# Patient Record
Sex: MALE | Race: WHITE | NOT HISPANIC OR LATINO | Employment: UNEMPLOYED | URBAN - METROPOLITAN AREA
[De-identification: names, ages, dates, MRNs, and addresses within clinical notes are randomized per-mention and may not be internally consistent; named-entity substitution may affect disease eponyms.]

---

## 2023-08-18 ENCOUNTER — OFFICE VISIT (OUTPATIENT)
Dept: PRIMARY CARE | Facility: CLINIC | Age: 53
End: 2023-08-18
Payer: COMMERCIAL

## 2023-08-18 VITALS
BODY MASS INDEX: 27.09 KG/M2 | OXYGEN SATURATION: 97 % | TEMPERATURE: 97.1 F | WEIGHT: 200 LBS | HEART RATE: 70 BPM | HEIGHT: 72 IN | SYSTOLIC BLOOD PRESSURE: 144 MMHG | DIASTOLIC BLOOD PRESSURE: 81 MMHG

## 2023-08-18 DIAGNOSIS — E78.2 MIXED HYPERLIPIDEMIA: ICD-10-CM

## 2023-08-18 DIAGNOSIS — R73.03 PREDIABETES: ICD-10-CM

## 2023-08-18 DIAGNOSIS — R29.818 SUSPECTED SLEEP APNEA: Primary | ICD-10-CM

## 2023-08-18 DIAGNOSIS — R06.83 LOUD SNORING: ICD-10-CM

## 2023-08-18 DIAGNOSIS — E66.3 OVERWEIGHT: ICD-10-CM

## 2023-08-18 DIAGNOSIS — I10 PRIMARY HYPERTENSION: ICD-10-CM

## 2023-08-18 DIAGNOSIS — R40.0 DAYTIME SOMNOLENCE: ICD-10-CM

## 2023-08-18 PROBLEM — E78.5 HYPERLIPIDEMIA: Status: ACTIVE | Noted: 2023-08-18

## 2023-08-18 PROCEDURE — 99214 OFFICE O/P EST MOD 30 MIN: CPT | Performed by: INTERNAL MEDICINE

## 2023-08-18 PROCEDURE — 3077F SYST BP >= 140 MM HG: CPT | Performed by: INTERNAL MEDICINE

## 2023-08-18 PROCEDURE — 3079F DIAST BP 80-89 MM HG: CPT | Performed by: INTERNAL MEDICINE

## 2023-08-18 PROCEDURE — 3008F BODY MASS INDEX DOCD: CPT | Performed by: INTERNAL MEDICINE

## 2023-08-18 RX ORDER — ROSUVASTATIN CALCIUM 10 MG/1
1 TABLET, COATED ORAL NIGHTLY
COMMUNITY
Start: 2021-06-23

## 2023-08-18 NOTE — PROGRESS NOTES
"Subjective   Lorna Gibbs is a 52 y.o. male who presents for Snoring.  HPI  Snoring: wife has been complaining; not new but recently addressed; worse on back  Tired: often feels tired during the day; wakes up tired  Observed: \"no rhythm\", no observed gasping, no mention of pauses  Pressure: grade 1 HTN, not treated  BMI: 27  Age: 52  Neck: 16 7/8\"/42cm  Gender: male    Even without scoring the observed, score is 6    Objective   /81   Pulse 70   Temp 36.2 °C (97.1 °F)   Ht 1.816 m (5' 11.5\")   Wt 90.7 kg (200 lb)   SpO2 97%   BMI 27.51 kg/m²    Physical Exam  NAD  Narrow posterior OP  RRR  Lungs CTAB  No edema  Assessment/Plan     High PTP of RADHA with symptoms of snoring, daytime somnolence and hypertension.    HLD: only taking Crestor intermittently (forgetting), has not rechecked    RTC after testing completed for AWV  Problem List Items Addressed This Visit    None           Davide Henderson MD   "

## 2023-10-15 PROBLEM — L30.9 DERMATITIS: Status: ACTIVE | Noted: 2023-10-15

## 2023-10-15 PROBLEM — H57.9 FUNDOSCOPY ABNORMAL: Status: ACTIVE | Noted: 2023-10-15

## 2023-10-15 PROBLEM — H52.00 HYPEROPIA: Status: ACTIVE | Noted: 2023-10-15

## 2023-10-15 PROBLEM — H52.03 HYPEROPIA OF BOTH EYES: Status: ACTIVE | Noted: 2023-10-15

## 2023-10-15 PROBLEM — H50.22 VERTICAL STRABISMUS OF LEFT EYE: Status: ACTIVE | Noted: 2023-10-15

## 2023-10-15 PROBLEM — H50.331 INTERMITTENT MONOCULAR EXOTROPIA OF RIGHT EYE: Status: ACTIVE | Noted: 2023-10-15

## 2023-10-15 PROBLEM — K42.9 UMBILICAL HERNIA WITHOUT OBSTRUCTION AND WITHOUT GANGRENE: Status: ACTIVE | Noted: 2023-10-15

## 2023-10-15 PROBLEM — H53.10: Status: ACTIVE | Noted: 2023-10-15

## 2023-10-15 PROBLEM — B35.3 TINEA PEDIS, RECURRENT: Status: ACTIVE | Noted: 2023-10-15

## 2023-10-15 PROBLEM — L29.0 PRURITUS ANI: Status: ACTIVE | Noted: 2023-10-15

## 2023-10-15 PROBLEM — G89.29 CHRONIC LEFT-SIDED LOW BACK PAIN WITHOUT SCIATICA: Status: ACTIVE | Noted: 2023-10-15

## 2023-10-15 PROBLEM — Z98.890 HISTORY OF STRABISMUS SURGERY: Status: ACTIVE | Noted: 2023-10-15

## 2023-10-15 PROBLEM — M54.50 CHRONIC LEFT-SIDED LOW BACK PAIN WITHOUT SCIATICA: Status: ACTIVE | Noted: 2023-10-15

## 2023-10-15 PROBLEM — B35.6 TINEA CRURIS: Status: ACTIVE | Noted: 2023-10-15

## 2023-10-17 ENCOUNTER — APPOINTMENT (OUTPATIENT)
Dept: SLEEP MEDICINE | Facility: CLINIC | Age: 53
End: 2023-10-17
Payer: COMMERCIAL

## 2023-11-20 ENCOUNTER — APPOINTMENT (OUTPATIENT)
Dept: PRIMARY CARE | Facility: CLINIC | Age: 53
End: 2023-11-20
Payer: COMMERCIAL

## 2024-07-11 ENCOUNTER — PROCEDURE VISIT (OUTPATIENT)
Dept: SLEEP MEDICINE | Facility: CLINIC | Age: 54
End: 2024-07-11
Payer: COMMERCIAL

## 2024-07-11 DIAGNOSIS — R29.818 SUSPECTED SLEEP APNEA: ICD-10-CM

## 2024-07-12 VITALS
WEIGHT: 204 LBS | BODY MASS INDEX: 28.56 KG/M2 | HEART RATE: 58 BPM | SYSTOLIC BLOOD PRESSURE: 152 MMHG | OXYGEN SATURATION: 95 % | DIASTOLIC BLOOD PRESSURE: 96 MMHG | RESPIRATION RATE: 16 BRPM | HEIGHT: 71 IN

## 2024-07-12 ASSESSMENT — SLEEP AND FATIGUE QUESTIONNAIRES
HOW LIKELY ARE YOU TO NOD OFF OR FALL ASLEEP IN A CAR, WHILE STOPPED FOR A FEW MINUTES IN TRAFFIC: SLIGHT CHANCE OF DOZING
ESS-CHAD TOTAL SCORE: 13
HOW LIKELY ARE YOU TO NOD OFF OR FALL ASLEEP WHEN YOU ARE A PASSENGER IN A CAR FOR AN HOUR WITHOUT A BREAK: MODERATE CHANCE OF DOZING
HOW LIKELY ARE YOU TO NOD OFF OR FALL ASLEEP WHILE SITTING AND TALKING TO SOMEONE: SLIGHT CHANCE OF DOZING
SITING INACTIVE IN A PUBLIC PLACE LIKE A CLASS ROOM OR A MOVIE THEATER: SLIGHT CHANCE OF DOZING
HOW LIKELY ARE YOU TO NOD OFF OR FALL ASLEEP WHILE SITTING QUIETLY AFTER LUNCH WITHOUT ALCOHOL: HIGH CHANCE OF DOZING
HOW LIKELY ARE YOU TO NOD OFF OR FALL ASLEEP WHILE WATCHING TV: WOULD NEVER DOZE
HOW LIKELY ARE YOU TO NOD OFF OR FALL ASLEEP WHILE LYING DOWN TO REST IN THE AFTERNOON WHEN CIRCUMSTANCES PERMIT: HIGH CHANCE OF DOZING
HOW LIKELY ARE YOU TO NOD OFF OR FALL ASLEEP WHILE SITTING AND READING: MODERATE CHANCE OF DOZING

## 2024-07-12 NOTE — PROGRESS NOTES
[facility] TECH NOTE:     Patient: Lorna Gibbs   MRN//AGE: [id number]  1970  53 y.o.   Technologist: oLri Garcia   Room: 2   Service Date: 2024        Sleep Testing Location: [place]  Neck: 40.5 cm  [place]: 13    TECHNOLOGIST SLEEP STUDY PROCEDURE NOTE:   This sleep study is being conducted according to the policies and procedures outlined by the AAS accreditation standards.  The sleep study procedure and processes involved during this appointment was explained to the patient/patient’s family, questions were answered. The patient/family verbalized understanding.      The patient is a 53-year-old male who was scheduled for a Diagnostic PSG.    The study that was ultimately completed was a Diagnostic PSG.    The full study was completed.  Patient questionnaires completed?: yes     Consents signed? yes    Initial Fall Risk Screening:     Lorna has not fallen in the last 6 months.  Lorna does not have a fear of falling. He does not need assistance with sitting, standing, or walking. He does not need assistance walking in his home. He does not need assistance in an unfamiliar setting. The patient is not using an assistive device.     Brief Study observations: Diagnostic split criteria was not met prior to 0300.  Frequent tossing and turning was observed.  Snoring was heard intermittently.      After the procedure, the patient/family was informed to ensure followup with ordering clinician for testing results.      Technologist: Lori Garcia

## 2024-07-31 ENCOUNTER — APPOINTMENT (OUTPATIENT)
Dept: PRIMARY CARE | Facility: CLINIC | Age: 54
End: 2024-07-31
Payer: COMMERCIAL

## 2024-07-31 VITALS
WEIGHT: 200.9 LBS | TEMPERATURE: 97.9 F | OXYGEN SATURATION: 98 % | HEART RATE: 66 BPM | BODY MASS INDEX: 27.63 KG/M2 | SYSTOLIC BLOOD PRESSURE: 167 MMHG | DIASTOLIC BLOOD PRESSURE: 100 MMHG

## 2024-07-31 DIAGNOSIS — E66.3 OVERWEIGHT: ICD-10-CM

## 2024-07-31 DIAGNOSIS — Z12.11 ENCOUNTER FOR COLORECTAL CANCER SCREENING: ICD-10-CM

## 2024-07-31 DIAGNOSIS — R73.03 PREDIABETES: ICD-10-CM

## 2024-07-31 DIAGNOSIS — G47.33 OBSTRUCTIVE SLEEP APNEA: ICD-10-CM

## 2024-07-31 DIAGNOSIS — Z12.12 ENCOUNTER FOR COLORECTAL CANCER SCREENING: ICD-10-CM

## 2024-07-31 DIAGNOSIS — I10 PRIMARY HYPERTENSION: ICD-10-CM

## 2024-07-31 DIAGNOSIS — E78.2 MIXED HYPERLIPIDEMIA: ICD-10-CM

## 2024-07-31 DIAGNOSIS — Z00.00 ENCOUNTER FOR WELLNESS EXAMINATION: Primary | ICD-10-CM

## 2024-07-31 LAB
ALBUMIN SERPL BCP-MCNC: 4.7 G/DL (ref 3.4–5)
ALP SERPL-CCNC: 78 U/L (ref 33–120)
ALT SERPL W P-5'-P-CCNC: 32 U/L (ref 10–52)
ANION GAP SERPL CALC-SCNC: 16 MMOL/L (ref 10–20)
AST SERPL W P-5'-P-CCNC: 25 U/L (ref 9–39)
BILIRUB SERPL-MCNC: 0.8 MG/DL (ref 0–1.2)
BUN SERPL-MCNC: 18 MG/DL (ref 6–23)
CALCIUM SERPL-MCNC: 10 MG/DL (ref 8.6–10.6)
CHLORIDE SERPL-SCNC: 101 MMOL/L (ref 98–107)
CHOLEST SERPL-MCNC: 281 MG/DL (ref 0–199)
CHOLESTEROL/HDL RATIO: 7.6
CO2 SERPL-SCNC: 28 MMOL/L (ref 21–32)
CREAT SERPL-MCNC: 1 MG/DL (ref 0.5–1.3)
EGFRCR SERPLBLD CKD-EPI 2021: 90 ML/MIN/1.73M*2
ERYTHROCYTE [DISTWIDTH] IN BLOOD BY AUTOMATED COUNT: 12.6 % (ref 11.5–14.5)
EST. AVERAGE GLUCOSE BLD GHB EST-MCNC: 108 MG/DL
GLUCOSE SERPL-MCNC: 87 MG/DL (ref 74–99)
HBA1C MFR BLD: 5.4 %
HCT VFR BLD AUTO: 49.5 % (ref 41–52)
HDLC SERPL-MCNC: 37.2 MG/DL
HGB BLD-MCNC: 16.7 G/DL (ref 13.5–17.5)
LDLC SERPL CALC-MCNC: 193 MG/DL
MCH RBC QN AUTO: 28.8 PG (ref 26–34)
MCHC RBC AUTO-ENTMCNC: 33.7 G/DL (ref 32–36)
MCV RBC AUTO: 86 FL (ref 80–100)
NON HDL CHOLESTEROL: 244 MG/DL (ref 0–149)
NRBC BLD-RTO: 0 /100 WBCS (ref 0–0)
PLATELET # BLD AUTO: 327 X10*3/UL (ref 150–450)
POTASSIUM SERPL-SCNC: 4.6 MMOL/L (ref 3.5–5.3)
PROT SERPL-MCNC: 7.5 G/DL (ref 6.4–8.2)
PSA SERPL-MCNC: 1.01 NG/ML
RBC # BLD AUTO: 5.79 X10*6/UL (ref 4.5–5.9)
SODIUM SERPL-SCNC: 140 MMOL/L (ref 136–145)
TRIGL SERPL-MCNC: 256 MG/DL (ref 0–149)
VLDL: 51 MG/DL (ref 0–40)
WBC # BLD AUTO: 8.7 X10*3/UL (ref 4.4–11.3)

## 2024-07-31 PROCEDURE — 84153 ASSAY OF PSA TOTAL: CPT

## 2024-07-31 PROCEDURE — 80061 LIPID PANEL: CPT

## 2024-07-31 PROCEDURE — 80053 COMPREHEN METABOLIC PANEL: CPT

## 2024-07-31 PROCEDURE — 85027 COMPLETE CBC AUTOMATED: CPT

## 2024-07-31 PROCEDURE — 83036 HEMOGLOBIN GLYCOSYLATED A1C: CPT

## 2024-07-31 PROCEDURE — 36415 COLL VENOUS BLD VENIPUNCTURE: CPT

## 2024-07-31 NOTE — PROGRESS NOTES
Subjective   Patient ID: Lorna Gibbs is a 53 y.o. male who presents for Annual Exam    Past medical history includes RADHA, hypertension, prediabetes, hyperlipidemia and overweight.    Last well-visit over 2 years ago.  Accompanied by his wife.    INTERIM:  - 7/11/2024, sleep study with moderate severity RADHA and nocturnal hypoxemia; referred to sleep medicine (appt 8/20/2024)  - eye surgery for strabismus and ophthalmology follow-up    Home BP is averaging around 140/90.    In a weight loss competition with a group of friends, eating less processed foods, cut own danishes for breakfast, drinks Crystal Light when he has a craving.  Has gone from 208 --> 201 so far.  Sleep patterns are irregular.  A little walking, 20 minutes per day.          Objective   Physical Exam    BP (!) 167/100   Pulse 66   Temp 36.6 °C (97.9 °F)   Wt 91.1 kg (200 lb 14.4 oz)   SpO2 98%   BMI 27.63 kg/m²      Gen: NAD, pleasant, A&;Ox3  HEENT: PERRL, EOMI, MMM, OP clear  Neck: supple, no thyromegaly, no JVD, normal carotid upstroke  Pulm: lungs CTAB, good air movement  CV: RRR, no m/r/g, 2+ DP pulses  Abd: NABS, soft, NT, ND no HSM  Ext: no peripheral edema  Neuro: CN II-XII intact, no focal sensory or motor deficits, normal reflexes    Assessment/Plan     Cholesterol: LDL is high as is hs-CRP;  statin prescribed in 2021, never taken  - recheck lipid panel and CAC    Hypertension: Elevated, declined treatment, wanted another try to improve with working on lifestyle modifications, did not RTC; complicated by RDAHA.  - follow-up after initiating CPAP, home BP reported around 140/90    RADHA, moderate: sleep medicine referral appointment pending     Prediabetes: Discussed risks of progression to diabetes and recommended strategies with regards to diet and exercise to limit that risk.     Health maintenance  -Last colonoscopy: Ordered initial screening, never done, agrees CG  - PSA: 1.13ng/mL (2021); check with next labs  -Smoking history: Light,  quit prior to age 35  -Counseled regarding diet and exercise, extensive discussion  -Immunizations: Recommend annual influenza, consider Shingrix,  -Followup in 2-3 months    Davide Henderson MD

## 2024-08-08 ENCOUNTER — APPOINTMENT (OUTPATIENT)
Dept: RADIOLOGY | Facility: CLINIC | Age: 54
End: 2024-08-08
Payer: COMMERCIAL

## 2024-08-09 ENCOUNTER — HOSPITAL ENCOUNTER (OUTPATIENT)
Dept: RADIOLOGY | Facility: CLINIC | Age: 54
Discharge: HOME | End: 2024-08-09
Payer: COMMERCIAL

## 2024-08-09 DIAGNOSIS — E78.2 MIXED HYPERLIPIDEMIA: ICD-10-CM

## 2024-08-09 DIAGNOSIS — I10 PRIMARY HYPERTENSION: ICD-10-CM

## 2024-08-09 DIAGNOSIS — R73.03 PREDIABETES: ICD-10-CM

## 2024-08-09 PROCEDURE — 75571 CT HRT W/O DYE W/CA TEST: CPT

## 2024-08-20 ENCOUNTER — OFFICE VISIT (OUTPATIENT)
Dept: SLEEP MEDICINE | Facility: HOSPITAL | Age: 54
End: 2024-08-20
Payer: COMMERCIAL

## 2024-08-20 DIAGNOSIS — G47.33 OBSTRUCTIVE SLEEP APNEA: Primary | ICD-10-CM

## 2024-08-20 PROCEDURE — 1036F TOBACCO NON-USER: CPT | Performed by: GENERAL PRACTICE

## 2024-08-20 PROCEDURE — 99244 OFF/OP CNSLTJ NEW/EST MOD 40: CPT | Performed by: GENERAL PRACTICE

## 2024-08-20 ASSESSMENT — PATIENT HEALTH QUESTIONNAIRE - PHQ9
2. FEELING DOWN, DEPRESSED OR HOPELESS: NOT AT ALL
1. LITTLE INTEREST OR PLEASURE IN DOING THINGS: NOT AT ALL
SUM OF ALL RESPONSES TO PHQ9 QUESTIONS 1 AND 2: 0

## 2024-08-20 ASSESSMENT — SLEEP AND FATIGUE QUESTIONNAIRES
HOW LIKELY ARE YOU TO NOD OFF OR FALL ASLEEP WHILE SITTING AND READING: MODERATE CHANCE OF DOZING
WORRIED_DISTRESSED_DUE_TO_SLEEP: A LITTLE
HOW LIKELY ARE YOU TO NOD OFF OR FALL ASLEEP WHILE LYING DOWN TO REST IN THE AFTERNOON WHEN CIRCUMSTANCES PERMIT: HIGH CHANCE OF DOZING
DIFFICULTY_FALLING_ASLEEP: MODERATE
WAKING_TOO_EARLY: SEVERE
DIFFICULTY_STAYING_ASLEEP: MODERATE
HOW LIKELY ARE YOU TO NOD OFF OR FALL ASLEEP WHILE WATCHING TV: MODERATE CHANCE OF DOZING
HOW LIKELY ARE YOU TO NOD OFF OR FALL ASLEEP WHILE SITTING AND TALKING TO SOMEONE: SLIGHT CHANCE OF DOZING
ESS-CHAD TOTAL SCORE: 13
SITING INACTIVE IN A PUBLIC PLACE LIKE A CLASS ROOM OR A MOVIE THEATER: SLIGHT CHANCE OF DOZING
SATISFACTION_WITH_CURRENT_SLEEP_PATTERN: SATISFIED
HOW LIKELY ARE YOU TO NOD OFF OR FALL ASLEEP WHILE SITTING QUIETLY AFTER LUNCH WITHOUT ALCOHOL: SLIGHT CHANCE OF DOZING
HOW LIKELY ARE YOU TO NOD OFF OR FALL ASLEEP WHEN YOU ARE A PASSENGER IN A CAR FOR AN HOUR WITHOUT A BREAK: MODERATE CHANCE OF DOZING
SLEEP_PROBLEM_NOTICEABLE_TO_OTHERS: SOMEWHAT
HOW LIKELY ARE YOU TO NOD OFF OR FALL ASLEEP IN A CAR, WHILE STOPPED FOR A FEW MINUTES IN TRAFFIC: SLIGHT CHANCE OF DOZING

## 2024-08-20 ASSESSMENT — ENCOUNTER SYMPTOMS
OCCASIONAL FEELINGS OF UNSTEADINESS: 1
LOSS OF SENSATION IN FEET: 0
DEPRESSION: 0

## 2024-08-20 ASSESSMENT — COLUMBIA-SUICIDE SEVERITY RATING SCALE - C-SSRS
2. HAVE YOU ACTUALLY HAD ANY THOUGHTS OF KILLING YOURSELF?: NO
1. IN THE PAST MONTH, HAVE YOU WISHED YOU WERE DEAD OR WISHED YOU COULD GO TO SLEEP AND NOT WAKE UP?: NO
6. HAVE YOU EVER DONE ANYTHING, STARTED TO DO ANYTHING, OR PREPARED TO DO ANYTHING TO END YOUR LIFE?: NO
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
## 2024-08-22 LAB — NONINV COLON CA DNA+OCC BLD SCRN STL QL: NEGATIVE

## 2024-08-29 ENCOUNTER — HOSPITAL ENCOUNTER (OUTPATIENT)
Dept: RADIOLOGY | Facility: CLINIC | Age: 54
Discharge: HOME | End: 2024-08-29
Payer: COMMERCIAL

## 2024-08-29 DIAGNOSIS — M79.671 PAIN IN BOTH FEET: ICD-10-CM

## 2024-08-29 DIAGNOSIS — M79.672 PAIN IN BOTH FEET: ICD-10-CM

## 2024-08-29 PROCEDURE — 73630 X-RAY EXAM OF FOOT: CPT | Mod: 50

## 2024-09-25 ENCOUNTER — APPOINTMENT (OUTPATIENT)
Dept: PRIMARY CARE | Facility: CLINIC | Age: 54
End: 2024-09-25
Payer: COMMERCIAL

## 2024-09-25 VITALS
WEIGHT: 191.5 LBS | SYSTOLIC BLOOD PRESSURE: 156 MMHG | HEART RATE: 68 BPM | DIASTOLIC BLOOD PRESSURE: 98 MMHG | BODY MASS INDEX: 26.81 KG/M2 | HEIGHT: 71 IN | TEMPERATURE: 95.9 F | OXYGEN SATURATION: 98 %

## 2024-09-25 DIAGNOSIS — R73.03 PREDIABETES: ICD-10-CM

## 2024-09-25 DIAGNOSIS — G47.33 OBSTRUCTIVE SLEEP APNEA: ICD-10-CM

## 2024-09-25 DIAGNOSIS — I10 PRIMARY HYPERTENSION: Primary | ICD-10-CM

## 2024-09-25 DIAGNOSIS — E78.2 MIXED HYPERLIPIDEMIA: ICD-10-CM

## 2024-09-25 PROCEDURE — 3008F BODY MASS INDEX DOCD: CPT | Performed by: INTERNAL MEDICINE

## 2024-09-25 PROCEDURE — 3077F SYST BP >= 140 MM HG: CPT | Performed by: INTERNAL MEDICINE

## 2024-09-25 PROCEDURE — 99213 OFFICE O/P EST LOW 20 MIN: CPT | Performed by: INTERNAL MEDICINE

## 2024-09-25 PROCEDURE — 3080F DIAST BP >= 90 MM HG: CPT | Performed by: INTERNAL MEDICINE

## 2024-09-25 ASSESSMENT — PAIN SCALES - GENERAL: PAINLEVEL: 0-NO PAIN

## 2024-09-25 NOTE — PROGRESS NOTES
"Subjective   Patient ID: Lorna Gibbs is a 54 y.o. male who presents for Follow-up    Past medical history includes RADHA, hypertension, prediabetes, hyperlipidemia and overweight.    INTERIM:  - 7/11/2024, sleep study with moderate severity RADHA and nocturnal hypoxemia; referred to sleep medicine (appt 8/20/2024) and started on CPAP, still adjusting  - ophthalmology follow-up, 8/6/2024    From initial weight of 208 pounds (June 2024) he is down to 191.  He has cut down on snacks, eating small lunch.  Try to continue taking walks regularly.  He has not checked his blood pressure in some time.    He is motivated to continue with the lifestyle improvements and get settled with the CPAP machine but at this time he is having difficulty sleeping with it and they are sending him a new device or equipment.    Recently with sore throat, started last Friday, achy, son had a cough, overall improving.         Objective   Physical Exam    BP (!) 156/98 (BP Location: Left arm, Patient Position: Sitting, BP Cuff Size: Adult)   Pulse 68   Temp 35.5 °C (95.9 °F) (Temporal)   Ht 1.803 m (5' 11\")   Wt 86.9 kg (191 lb 8 oz)   SpO2 98%   BMI 26.71 kg/m²      Gen: NAD, pleasant, A&;Ox3  HEENT: PERRL, EOMI, MMM, OP clear  Neck: supple, no thyromegaly, no JVD, normal carotid upstroke  Pulm: lungs CTAB, good air movement  CV: RRR, no m/r/g, 2+ DP pulses  Abd: NABS, soft, NT, ND no HSM  Ext: no peripheral edema  Neuro: CN II-XII intact, no focal sensory or motor deficits, normal reflexes    Assessment/Plan     Cholesterol: LDL is high as is hs-CRP;  statin prescribed in 2021, never taken  - recheck lipid panel also high and nonzero CAC although just 2.8 (8/9/2024)  -Reassess with continued lifestyle modifications, most likely will need to start on statin which he understands    Hypertension: Elevated, wants to hold off on treatment, working on lifestyle modifications, and RADHA treatment  - follow-up after initiating CPAP, home BP reported " around 140/90    RADHA, moderate: Just started CPAP, still having issues with fit, waiting on new mask     Prediabetes: Discussed risks of progression to diabetes and recommended strategies with regards to diet and exercise to limit that risk.     Health maintenance  -Last colonoscopy: Ordered initial screening, never done, agrees CG  - PSA: 1.13ng/mL (2021); check with next labs  -Smoking history: Light, quit prior to age 35  -Counseled regarding diet and exercise, extensive discussion  -Immunizations: Recommend annual influenza, consider Shingrix,  -Followup in 2-3 months    Davide Henderson MD

## 2024-11-06 ENCOUNTER — HOSPITAL ENCOUNTER (EMERGENCY)
Facility: HOSPITAL | Age: 54
Discharge: HOME | End: 2024-11-06
Attending: GENERAL PRACTICE
Payer: COMMERCIAL

## 2024-11-06 ENCOUNTER — APPOINTMENT (OUTPATIENT)
Dept: RADIOLOGY | Facility: HOSPITAL | Age: 54
End: 2024-11-06
Payer: COMMERCIAL

## 2024-11-06 VITALS
OXYGEN SATURATION: 95 % | SYSTOLIC BLOOD PRESSURE: 148 MMHG | WEIGHT: 195 LBS | BODY MASS INDEX: 27.2 KG/M2 | DIASTOLIC BLOOD PRESSURE: 100 MMHG | RESPIRATION RATE: 16 BRPM | TEMPERATURE: 97.7 F | HEART RATE: 62 BPM

## 2024-11-06 DIAGNOSIS — R07.9 CHEST PAIN, UNSPECIFIED TYPE: Primary | ICD-10-CM

## 2024-11-06 LAB
ALBUMIN SERPL BCP-MCNC: 4.4 G/DL (ref 3.4–5)
ALP SERPL-CCNC: 72 U/L (ref 33–120)
ALT SERPL W P-5'-P-CCNC: 17 U/L (ref 10–52)
ANION GAP SERPL CALC-SCNC: 12 MMOL/L (ref 10–20)
AST SERPL W P-5'-P-CCNC: 14 U/L (ref 9–39)
BASOPHILS # BLD AUTO: 0.04 X10*3/UL (ref 0–0.1)
BASOPHILS NFR BLD AUTO: 0.5 %
BILIRUB SERPL-MCNC: 0.6 MG/DL (ref 0–1.2)
BNP SERPL-MCNC: 10 PG/ML (ref 0–99)
BUN SERPL-MCNC: 24 MG/DL (ref 6–23)
CALCIUM SERPL-MCNC: 9.1 MG/DL (ref 8.6–10.3)
CARDIAC TROPONIN I PNL SERPL HS: 5 NG/L (ref 0–20)
CARDIAC TROPONIN I PNL SERPL HS: 5 NG/L (ref 0–20)
CHLORIDE SERPL-SCNC: 106 MMOL/L (ref 98–107)
CO2 SERPL-SCNC: 26 MMOL/L (ref 21–32)
CREAT SERPL-MCNC: 0.95 MG/DL (ref 0.5–1.3)
D DIMER PPP FEU-MCNC: <215 NG/ML FEU
EGFRCR SERPLBLD CKD-EPI 2021: >90 ML/MIN/1.73M*2
EOSINOPHIL # BLD AUTO: 0.24 X10*3/UL (ref 0–0.7)
EOSINOPHIL NFR BLD AUTO: 2.8 %
ERYTHROCYTE [DISTWIDTH] IN BLOOD BY AUTOMATED COUNT: 12.7 % (ref 11.5–14.5)
GLUCOSE SERPL-MCNC: 131 MG/DL (ref 74–99)
HCT VFR BLD AUTO: 46.6 % (ref 41–52)
HGB BLD-MCNC: 16.1 G/DL (ref 13.5–17.5)
IMM GRANULOCYTES # BLD AUTO: 0.03 X10*3/UL (ref 0–0.7)
IMM GRANULOCYTES NFR BLD AUTO: 0.4 % (ref 0–0.9)
LIPASE SERPL-CCNC: 40 U/L (ref 9–82)
LYMPHOCYTES # BLD AUTO: 2.33 X10*3/UL (ref 1.2–4.8)
LYMPHOCYTES NFR BLD AUTO: 27.3 %
MAGNESIUM SERPL-MCNC: 1.92 MG/DL (ref 1.6–2.4)
MCH RBC QN AUTO: 29.6 PG (ref 26–34)
MCHC RBC AUTO-ENTMCNC: 34.5 G/DL (ref 32–36)
MCV RBC AUTO: 86 FL (ref 80–100)
MONOCYTES # BLD AUTO: 0.61 X10*3/UL (ref 0.1–1)
MONOCYTES NFR BLD AUTO: 7.2 %
NEUTROPHILS # BLD AUTO: 5.27 X10*3/UL (ref 1.2–7.7)
NEUTROPHILS NFR BLD AUTO: 61.8 %
NRBC BLD-RTO: 0 /100 WBCS (ref 0–0)
PLATELET # BLD AUTO: 293 X10*3/UL (ref 150–450)
POTASSIUM SERPL-SCNC: 3.8 MMOL/L (ref 3.5–5.3)
PROT SERPL-MCNC: 6.6 G/DL (ref 6.4–8.2)
RBC # BLD AUTO: 5.44 X10*6/UL (ref 4.5–5.9)
SODIUM SERPL-SCNC: 140 MMOL/L (ref 136–145)
WBC # BLD AUTO: 8.5 X10*3/UL (ref 4.4–11.3)

## 2024-11-06 PROCEDURE — 80053 COMPREHEN METABOLIC PANEL: CPT | Performed by: SURGERY

## 2024-11-06 PROCEDURE — 71046 X-RAY EXAM CHEST 2 VIEWS: CPT | Performed by: SURGERY

## 2024-11-06 PROCEDURE — 36415 COLL VENOUS BLD VENIPUNCTURE: CPT | Performed by: SURGERY

## 2024-11-06 PROCEDURE — 36415 COLL VENOUS BLD VENIPUNCTURE: CPT | Performed by: GENERAL PRACTICE

## 2024-11-06 PROCEDURE — 85379 FIBRIN DEGRADATION QUANT: CPT | Performed by: GENERAL PRACTICE

## 2024-11-06 PROCEDURE — 83880 ASSAY OF NATRIURETIC PEPTIDE: CPT | Performed by: SURGERY

## 2024-11-06 PROCEDURE — 85025 COMPLETE CBC W/AUTO DIFF WBC: CPT | Performed by: SURGERY

## 2024-11-06 PROCEDURE — 84484 ASSAY OF TROPONIN QUANT: CPT | Performed by: SURGERY

## 2024-11-06 PROCEDURE — 71046 X-RAY EXAM CHEST 2 VIEWS: CPT

## 2024-11-06 PROCEDURE — 99284 EMERGENCY DEPT VISIT MOD MDM: CPT | Mod: 25

## 2024-11-06 PROCEDURE — 83735 ASSAY OF MAGNESIUM: CPT | Performed by: SURGERY

## 2024-11-06 PROCEDURE — 83690 ASSAY OF LIPASE: CPT | Performed by: GENERAL PRACTICE

## 2024-11-06 PROCEDURE — 99283 EMERGENCY DEPT VISIT LOW MDM: CPT | Mod: 25

## 2024-11-06 RX ORDER — FENTANYL CITRATE 50 UG/ML
50 INJECTION, SOLUTION INTRAMUSCULAR; INTRAVENOUS ONCE
Status: DISCONTINUED | OUTPATIENT
Start: 2024-11-06 | End: 2024-11-06 | Stop reason: HOSPADM

## 2024-11-06 ASSESSMENT — COLUMBIA-SUICIDE SEVERITY RATING SCALE - C-SSRS
6. HAVE YOU EVER DONE ANYTHING, STARTED TO DO ANYTHING, OR PREPARED TO DO ANYTHING TO END YOUR LIFE?: NO
2. HAVE YOU ACTUALLY HAD ANY THOUGHTS OF KILLING YOURSELF?: NO
1. IN THE PAST MONTH, HAVE YOU WISHED YOU WERE DEAD OR WISHED YOU COULD GO TO SLEEP AND NOT WAKE UP?: NO

## 2024-11-06 ASSESSMENT — PAIN DESCRIPTION - LOCATION: LOCATION: CHEST

## 2024-11-06 ASSESSMENT — PAIN SCALES - GENERAL: PAINLEVEL_OUTOF10: 5 - MODERATE PAIN

## 2024-11-06 ASSESSMENT — PAIN - FUNCTIONAL ASSESSMENT: PAIN_FUNCTIONAL_ASSESSMENT: 0-10

## 2024-11-06 NOTE — ED TRIAGE NOTES
PT TO ED VIA EMS FROM HOME FOR C/O CP THAT BEGAN AT 0130 THIS AM. PT ENDORSES DIZZINESS AND NAUSEA. PT DENIES SOB.
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
## 2024-11-13 ENCOUNTER — OFFICE VISIT (OUTPATIENT)
Dept: CARDIOLOGY | Facility: HOSPITAL | Age: 54
End: 2024-11-13
Payer: COMMERCIAL

## 2024-11-13 VITALS
DIASTOLIC BLOOD PRESSURE: 95 MMHG | HEIGHT: 71 IN | SYSTOLIC BLOOD PRESSURE: 155 MMHG | WEIGHT: 193 LBS | BODY MASS INDEX: 27.02 KG/M2 | HEART RATE: 69 BPM

## 2024-11-13 DIAGNOSIS — R07.9 CHEST PAIN, UNSPECIFIED TYPE: ICD-10-CM

## 2024-11-13 DIAGNOSIS — E78.5 HYPERLIPIDEMIA, UNSPECIFIED HYPERLIPIDEMIA TYPE: Primary | ICD-10-CM

## 2024-11-13 DIAGNOSIS — I10 HYPERTENSION, UNSPECIFIED TYPE: ICD-10-CM

## 2024-11-13 LAB
ATRIAL RATE: 69 BPM
P AXIS: 62 DEGREES
P OFFSET: 182 MS
P ONSET: 128 MS
PR INTERVAL: 178 MS
Q ONSET: 217 MS
QRS COUNT: 11 BEATS
QRS DURATION: 112 MS
QT INTERVAL: 388 MS
QTC CALCULATION(BAZETT): 415 MS
QTC FREDERICIA: 406 MS
R AXIS: 67 DEGREES
T AXIS: 50 DEGREES
T OFFSET: 411 MS
VENTRICULAR RATE: 69 BPM

## 2024-11-13 PROCEDURE — 1036F TOBACCO NON-USER: CPT | Performed by: INTERNAL MEDICINE

## 2024-11-13 PROCEDURE — 99215 OFFICE O/P EST HI 40 MIN: CPT | Mod: 25 | Performed by: INTERNAL MEDICINE

## 2024-11-13 PROCEDURE — 3008F BODY MASS INDEX DOCD: CPT | Performed by: INTERNAL MEDICINE

## 2024-11-13 PROCEDURE — 3080F DIAST BP >= 90 MM HG: CPT | Performed by: INTERNAL MEDICINE

## 2024-11-13 PROCEDURE — 3077F SYST BP >= 140 MM HG: CPT | Performed by: INTERNAL MEDICINE

## 2024-11-13 PROCEDURE — 93005 ELECTROCARDIOGRAM TRACING: CPT | Performed by: INTERNAL MEDICINE

## 2024-11-13 RX ORDER — ATORVASTATIN CALCIUM 20 MG/1
20 TABLET, FILM COATED ORAL DAILY
Qty: 90 TABLET | Refills: 3 | Status: SHIPPED | OUTPATIENT
Start: 2024-11-13 | End: 2025-11-13

## 2024-11-13 RX ORDER — AMLODIPINE BESYLATE 5 MG/1
5 TABLET ORAL DAILY
Qty: 90 TABLET | Refills: 3 | Status: SHIPPED | OUTPATIENT
Start: 2024-11-13 | End: 2025-11-13

## 2024-11-13 ASSESSMENT — ENCOUNTER SYMPTOMS
DEPRESSION: 0
LOSS OF SENSATION IN FEET: 0
OCCASIONAL FEELINGS OF UNSTEADINESS: 0

## 2024-11-13 NOTE — PROGRESS NOTES
Chief complaint:  I am seeing patient in consultation for Dr. Henderson regarding chest pain, hypertension, and hyperlipidemia.    HPI:  Patient is a 54-year-old gentleman.  He has a longstanding history of hypertension for which he has declined treatment for.  He denies any diabetes.  He was a smoker but quit 15 years ago.  There does appear to be some family history of premature coronary disease.  He does have known hyperlipidemia and has been on and off therapy over the years.  He has not been on therapy for some time.    Patient thought he may have seen a cardiologist remotely in the  system and may have had some testing done, but I could not track these records down.    Patient denies any previous MI or strokes.  There is no history of rheumatic fever or history of heart murmur.  He denies any palpitations, presyncope or syncope.    He was seen in the ER about a week ago for some central chest aching.  The symptomatology lasted several hours.  He was seen in the emergency room.  His BNP and troponins were negative as were his EKG.  He has not had any further episodes since then.  He did have a somewhat similar but milder episode several years ago.    He comes in today for further evaluation.  He does not have any clear exertionally mediated chest discomfort or claudication.  He has been noticing some increased dyspnea with his activities recently.  He did have a coronary calcium score several months ago which was minimally elevated at 2.8.    PMH/PSH:  Past medical history is remarkable for the untreated hypertension and hypercholesterolemia.  He also has some mild arthritis.  Previous surgeries include an umbilical hernia repair as well as eye surgery.    Allergies: NKDA.  EtOH: Denies use.  Caffeine: Moderate to heavy use.    FH/SH:  Patient is a 54-year-old gentleman who lives in [place] and is originally from [place].  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  He has been  for 30  years.  He has 8 children and 3 grandchildren.  He follows regularly with Dr. Henderson.    Review of systems:  All other systems have been reviewed and are negative for complaint.    Physical exam:  Vital Signs:  P-69   BP-155/95 (blood pressure was checked in both arms and was equal bilaterally at 155/95).  General: Alert, pleasant medium built middle-aged male in no distress.  HEENT: Normal EOMs without thyromegaly or lymphadenopathy.  Lungs: Clear with good air exchange and no crackles or wheezing.  Cardiac: Normal JVP and carotid upstrokes without bruit.  There is a normal S1 and S2 without murmur, rub or S3.  Abdomen: Nontender with normal bowel sounds and no bruits.  Extremities: No edema.  Skin: No acute rash.  Neuro: Intact without focal motor deficit.  Vascular: Normal brachial, radial and ulnar pulses bilaterally.  Normal popliteal and distal pulses bilaterally.    EKG: Normal sinus rhythm.  Within normal limits.    Lipid panel: Cholesterol-281, HDL-37, LDL-193, TG-256.    Impression/plan:  Patient presents with some atypical chest pain symptomatology.  I was pleased to see that his coronary calcium score was only minimally elevated.  I was also pleased to see he is not having any recurrent symptoms.  Given this, I did not think we needed to jump into additional testing immediately, but he knows to alert me for any recrudescence of his chest pain symptomatology.  I do suspect we will probably want to get an echocardiogram once we have his blood pressure under better control.    His blood pressure certainly is far from ideal.  I will start him on amlodipine at 5 mg daily.  He will monitor his blood pressures with his wrist cuff several times a week.  I will see him back for routine follow-up in 1 month, and we will sort out if we need to escalate his antihypertensive regimen at that time.    I did tell him that given the marked degree of his LDL elevation that we should be aggressive in getting his LDL down  to at least below 100.  He understood the rationale for this, was agreeable to starting atorvastatin at 20 mg daily.  I will sort out whether he is tolerating it when he returns in 1 month.  Assuming he is, we will plan on rechecking his lipid panel once he has been on statin therapy for 2 to 3 months.    I elected to embark on no other testing at this time and will not initiate any other medications.  He knows to call for any intercurrent concerns.    Patient instructions:    Begin amlodipine and monitor your blood pressure as directed.    Begin atorvastatin 20 mg daily.    Return to clinic in 1 month to reassess your blood pressure control.

## 2024-11-27 ENCOUNTER — APPOINTMENT (OUTPATIENT)
Dept: PRIMARY CARE | Facility: CLINIC | Age: 54
End: 2024-11-27
Payer: COMMERCIAL

## 2024-11-27 VITALS
TEMPERATURE: 97.1 F | HEIGHT: 71 IN | OXYGEN SATURATION: 96 % | BODY MASS INDEX: 26.71 KG/M2 | WEIGHT: 190.8 LBS | HEART RATE: 45 BPM | SYSTOLIC BLOOD PRESSURE: 142 MMHG | DIASTOLIC BLOOD PRESSURE: 89 MMHG

## 2024-11-27 DIAGNOSIS — G47.33 OBSTRUCTIVE SLEEP APNEA: ICD-10-CM

## 2024-11-27 DIAGNOSIS — R73.03 PREDIABETES: ICD-10-CM

## 2024-11-27 DIAGNOSIS — I10 PRIMARY HYPERTENSION: Primary | ICD-10-CM

## 2024-11-27 DIAGNOSIS — E78.2 MIXED HYPERLIPIDEMIA: ICD-10-CM

## 2024-11-27 PROCEDURE — 1036F TOBACCO NON-USER: CPT | Performed by: INTERNAL MEDICINE

## 2024-11-27 PROCEDURE — 3077F SYST BP >= 140 MM HG: CPT | Performed by: INTERNAL MEDICINE

## 2024-11-27 PROCEDURE — 3079F DIAST BP 80-89 MM HG: CPT | Performed by: INTERNAL MEDICINE

## 2024-11-27 PROCEDURE — 99214 OFFICE O/P EST MOD 30 MIN: CPT | Performed by: INTERNAL MEDICINE

## 2024-11-27 PROCEDURE — 3008F BODY MASS INDEX DOCD: CPT | Performed by: INTERNAL MEDICINE

## 2024-11-27 NOTE — PROGRESS NOTES
"Subjective   Patient ID: Lorna Gibbs is a 54 y.o. male who presents for Follow-up    Past medical history includes RADHA, hypertension, prediabetes, hyperlipidemia and overweight.    INTERIM:  -ED visit 11/6/2024 with atypical chest pain, unremarkable EKG, chest x-ray and labs with no recurrence  -Saw Dr. Vasquez, 11/13/2024, started him on amlodipine 5 mg and atorvastatin 20 mg daily, may consider echocardiogram with follow-up      Tolerating medications without any concerns.  Discussed treating sleep apnea and he is willing to follow-up and work on more consistent use as well as general improved sleep hygiene.  From initial weight of 208 pounds (June 2024) he is down to 190.  He has cut down on snacks, eating small lunch.  Try to continue taking walks regularly.      Objective   Physical Exam    /89 (BP Location: Left arm, Patient Position: Sitting, BP Cuff Size: Adult)   Pulse (!) 45   Temp 36.2 °C (97.1 °F) (Temporal)   Ht 1.803 m (5' 11\")   Wt 86.5 kg (190 lb 12.8 oz)   SpO2 96%   BMI 26.61 kg/m²      Gen: NAD, pleasant, A&;Ox3  HEENT: PERRL, EOMI, MMM, OP clear  Neck: supple, no thyromegaly, no JVD, normal carotid upstroke  Pulm: lungs CTAB, good air movement  CV: RRR, no m/r/g, 2+ DP pulses  Abd: NABS, soft, NT, ND no HSM  Ext: no peripheral edema  Neuro: CN II-XII intact, no focal sensory or motor deficits, normal reflexes    Assessment/Plan     Cholesterol: Elevated LDL, hs-CRP; CAC  2.8 (8/9/2024)  -Continue atorvastatin 20 mg, follow-up with cardiology as planned    Hypertension: Improving, suboptimal  -Continue amlodipine 5 mg daily  -Continue behavioral modifications and CPAP use    RADHA, moderate: 7/11/2024, sleep study with moderate severity RADHA and nocturnal hypoxemia  -Continue CPAP, still having issues with fit, waiting on new mask     Prediabetes: Discussed risks of progression to diabetes and recommended strategies with regards to diet and exercise to limit that risk.     Health " maintenance  -Last colonoscopy: Ordered initial screening, never done, agrees CG  - PSA: 1.13ng/mL (2021); check with next labs  -Smoking history: Light, quit prior to age 35  -Counseled regarding diet and exercise, extensive discussion  -Immunizations: Recommend annual influenza, consider Shingrix,  -Followup in 2-3 months    Davide Henderson MD

## 2024-12-18 ENCOUNTER — APPOINTMENT (OUTPATIENT)
Dept: CARDIOLOGY | Facility: HOSPITAL | Age: 54
End: 2024-12-18
Payer: COMMERCIAL

## 2025-01-13 ENCOUNTER — APPOINTMENT (OUTPATIENT)
Facility: CLINIC | Age: 55
End: 2025-01-13
Payer: COMMERCIAL

## 2025-01-13 DIAGNOSIS — G47.33 OBSTRUCTIVE SLEEP APNEA: Primary | ICD-10-CM

## 2025-01-13 DIAGNOSIS — I10 PRIMARY HYPERTENSION: ICD-10-CM

## 2025-01-13 PROCEDURE — 99214 OFFICE O/P EST MOD 30 MIN: CPT | Performed by: GENERAL PRACTICE

## 2025-01-13 PROCEDURE — 1036F TOBACCO NON-USER: CPT | Performed by: GENERAL PRACTICE

## 2025-01-13 NOTE — PROGRESS NOTES
Patient: Lorna Gibbs    [id number]  : 1970 -- AGE 54 y.o.    Provider: Erik Lyons DO     Location [place]   Service Date: 2025              [facility]  Follow up Visit Note    Virtual or Telephone Consent  An interactive audio and video telecommunication system which permits real time communications between the patient (at the originating site) and provider (at the distant site) was utilized to provide this telehealth service.     Verbal consent was requested and obtained from Lorna Gibbs on this date, 25 for a telehealth visit.       HISTORY OF PRESENT ILLNESS     The patient's referring provider is: No ref. provider found    HISTORY OF PRESENT ILLNESS   Lorna Gibbs is a 54 y.o. male who presents to a [facility] for a sleep medicine evaluation with concerns of No chief complaint on file..     The patient  has a past medical history of Personal history of other (healed) physical injury and trauma, Personal history of other diseases of the nervous system and sense organs, and Radiculopathy, cervical region (2018)..    PAST SLEEP HISTORY    Pmhx includes elevated HTN?,     Patient had a sleep study done due to snoring for yrs, he has not tried any intervention for his snoring. He would like to discuss his sleep study results.     25  Patient was has been trying to use pap therapy but is struggling.   In the last month he only used pap therapy for 2 days.     When he used pap therapy her reports the following:  Pap benefit: decreased/ eliminated snoring/ daytime sleepiness.   Pap issues: mask air leak, and dry mouth.     Sleep schedule  on weekdays / work days:  Usual Bedtime: 11pm-12am  Sleep latency: varies but mostly short.   Wake time : 6 am  Total sleep time average/day: 7 hours/day  Awakenings: 3-4x per night, nocturia/ unknown, short.   Naps: few times per week, sometimes dozes off  unintentionally.       Sleep schedule  on weekends/non work days :  Same as above    Sleep aids: n  Stimulants: n    Occupation: teacher, 6th grade.     Preferred sleeping position: SLEEP POSITION: sidelying    Sleep-related ROS:    Not using pap therapy regularly thus the following symptoms persist.     Snoring:  y but not with pap therapy.   Witnessed apneas:   n     Gasping/ choking: n    Am Dry mouth:  sometimes            Nasal congestion:  sometimes        am headaches: sometimes     Sleep is described as unrefreshing.     Daytime sleepiness: sometimes  Fatigue or decreased energy: sometimes   Difficulty remembering things in daytime: n  Difficulty staying focused in daytime: : n  Irritable during the day: n    Drowsy driving: n, coffee when needed   Hx of car accident: n  Near-miss Car accident: n      RLS screen:  RLSSCREEN: - Sensations: Patient does not have unusual sensations in their extremities that cause an urge to move them     Sleep-related behaviors: DENIES      REVIEW OF SYSTEMS     REVIEW OF SYSTEMS  Review of Systems   All other systems reviewed and are negative.      ALLERGIES AND MEDICATIONS     ALLERGIES  Allergies   Allergen Reactions    Kiwi Swelling       MEDICATIONS  Current Outpatient Medications   Medication Sig Dispense Refill    amLODIPine (Norvasc) 5 mg tablet Take 1 tablet (5 mg) by mouth once daily. 90 tablet 3    atorvastatin (Lipitor) 20 mg tablet Take 1 tablet (20 mg) by mouth once daily. 90 tablet 3     No current facility-administered medications for this visit.         PAST HISTORY     PAST MEDICAL HISTORY  He  has a past medical history of Personal history of other (healed) physical injury and trauma, Personal history of other diseases of the nervous system and sense organs, and Radiculopathy, cervical region (08/08/2018).      PAST SURGICAL HISTORY:  Past Surgical History:   Procedure Laterality Date    OTHER SURGICAL HISTORY  02/19/2016    Strabismus Repair By Recession        FAMILY HISTORY  Family History   Problem Relation Name Age of Onset    ALS Mother      Glaucoma Father      Other (leukocytosis) Father      Parkinsonism Father      Pulmonary embolism Father      Thyroid disease Sister      Cancer Maternal Grandmother      Heart attack Maternal Grandfather      Hyperlipidemia Other          siblings    Diabetes Other      Glaucoma Other grandmother      DOES/DOES NOT EC: does not have a family history of sleep disorder.      SOCIAL HISTORY  He  reports that he has quit smoking. His smoking use included cigarettes. He has never used smokeless tobacco. He reports that he does not drink alcohol and does not use drugs.     Caffeine consumption: 4-5 cups coffee throughout the day.   Alcohol consumption: no  Smoking: no  Marijuana: n  Other drugs: n      PHYSICAL EXAM     VITAL SIGNS: There were no vitals taken for this visit.     PREVIOUS WEIGHTS:  Wt Readings from Last 3 Encounters:   11/27/24 86.5 kg (190 lb 12.8 oz)   11/13/24 87.5 kg (193 lb)   11/06/24 88.5 kg (195 lb)       Physical Exam  Constitutional: Alert and oriented, cooperative, no obvious distress.   HENT: normocephalic.   Neurologic: AOx3.   psychiatric: appropriate mood and affect.  Integumentary: no significant rashes observed over pap mask area.     Has facial hair.     RESULTS/DATA       ASSESSMENT/PLAN     Mr. Gibbs is a 54 y.o. male and  has a past medical history of Personal history of other (healed) physical injury and trauma, Personal history of other diseases of the nervous system and sense organs, and Radiculopathy, cervical region (08/08/2018). He is following up on sleep apnea.     Problem List Items Addressed This Visit       Obstructive sleep apnea - Primary    Relevant Orders    Positive Airway Pressure (PAP) Therapy         Problem List and Orders  Pmhx includes elevated HTN?.     1- RADHA  PSG 7/11/24 --> severe RADHA, RDI 3% 30.6, RDI 4% 21.4, HINA 0.9. SpO2 misa 82%.     Reviewed and discussed the  above sleep study results and management options in details. All questions answered, patient verbalizes understanding.     Not currently using pap therapy due to not finding a comfortable mask and mask air leak.     -cont. Autopap 5-15cwp, rAHI 5.4, median pressure 5.2, max avg 38.2, needs to use pap therapy more regularly, has a large leak.     -ordered supplies  -ordered mask refitting.   -Please work with your DME to find a mask that fits you well, and controls your leak.    You can use a chin strap to help with your dry mouth, you can also adjust your humidity level to your comfort. You can use a nasal spray to help you breathe better from your nose.    -counseled on the importance of compliance in details.       -do not drive or operate heavy machinery if drowsy.  -avoid sleeping on your back.   -avoid sedating substances/ medication, alcohol, illicit drugs and tobacco.    2- Overweight   counseled on eating a healthy diet and exercising as tolerated.    3- HTN  Patient needs to measure his bp at home.   Cont. Current regimen  Follow up with pcp    Follow up 3 months or sooner as needed.

## 2025-01-13 NOTE — PATIENT INSTRUCTIONS
[facility]  DO [address] [place] [address] [address] [place] [address]       NAME: Lorna Gibbs   DATE: 1/13/2025     Your Sleep Provider Today: Erik Lyons DO  Your Primary Care Physician: Davide Henderson MD   Your Referring Provider: No ref. provider found    DIAGNOSIS:       Thank you for coming to the Sleep Medicine Clinic today! Your sleep medicine provider today was: Erik Lyons DO Below is a summary of your treatment plan, other important information, and our contact numbers:      TREATMENT PLAN         Instructions - Common RADHA Recs: - For your sleep apnea, continue to use your PAP every night and use it whenever you are sleeping.   - Avoid alcohol or sedatives several hours prior to sleeping.   - Get additional supplies for your PAP (e.g., mask, hose, filters) every 3 months or as your insurance allows from your [employer] company. Replacement cushions for your PAP mask can be requested monthly if airseals are an issue.  - Remember to clean your mask, tubings, and water chamber regularly as instructed.  - Avoid driving or operating heavy machinery when drowsy. A person driving while sleepy is five (5) times more likely to have an accident. If you feel sleepy, pull over and take a short power nap (sleep for less than 30 minutes). Otherwise, ask somebody to drive you.          IMPORTANT INFORMATION     Call 911 for medical emergencies.  Our offices are generally open from Monday-Friday, 9 am - 5 pm.  If you need to get in touch with me, you may either call me and my team(number is below) or you can use [employer].  If a referral for a test, for CPAP, or for another specialist was made, and you have not heard about scheduling this within a week, please call scheduling at [phone]).  If you are unable to make your appointment for clinic or an overnight study, kindly call the office at least 48 hours in advance to cancel and  reschedule.  If you are on CPAP, please bring your device's card or the device to each clinic appointment.   There are no supporting services by either the sleep doctors or their staff on weekends and Holidays, or after 5 PM on weekdays.   If you have been asked to come to a sleep study, make sure you bring toiletries, a comfy pillow, and any nighttime medications that you may regularly take. Also be sure to eat dinner before you arrive. We generally do not provide meals.      PRESCRIPTIONS     We require 7 days advanced notice for prescription refills. If we do not receive the request in this time, we cannot guarantee that your medication will be refilled in time.      IMPORTANT PHONE NUMBERS     Sleep Medicine Clinic Fax: [phone]  Appointments (for Pediatric Sleep Clinic): [phone] - option 1  Appointments (for Adult Sleep Clinic): [phone] - option 2  Appointments (For Sleep Studies): [phone] - option 3  Behavioral Sleep Medicine: [phone]  Sleep Surgery: [phone]  ENT (Otolaryngology): [phone]  Headache Clinic (Neurology): [phone]  Neurology: [phone]  Psychiatry: [phone]  Pulmonary Function Testing (PFT) Center: [phone]  Pulmonary Medicine: [phone]  [employer] (DME): [phone]  [employer] (DME): [phone]  [facility] [place] [phone]      OUR ADULT SLEEP MEDICINE TEAM   Please do not hesitate to call the office or sleep nurse with any questions between appointments:    Adult Sleep Nurses (Georgie Chicas, RN and Carol Pozo RN):  For clinical questions and refilling prescriptions: [phone]  Email sleep diaries and other documents at: [email or web address]    Adult Sleep Medicine Secretaries:  Adelia Bryant (For Raheem/Mcnair/Krise/Strohl/Yemendez/Pinon):   P: [phone]  F: [phone]  Myra Barclay (For Kwok/G[place]genbiller): P: [phone]  Fax: [phone]  Flor  Andi (For Jurcevic/Blank): P: [phone]  F: [phone]  Cristina Horowitz (For Ada): P: [phone]  F: [phone]  Sandra Tara (For Loni/Marlen/Zakhary): P: [phone]  F: [phone]  Cierra Hunt (For Gianluca/Mirza): P: [phone]  F: [phone]     Adult Sleep Medicine Advanced Practice Providers:  Onofre Barlow ([place], [place])  Maria E Gee ([facility])  Tawana Sneed CNP ([place], [place], [place])  Monica Proctor CNP ([place], [place], [place])  Yudith Espinal ([place], [place], [place])  Carlos Blue CNP ([place], [place])        OUR SLEEP TESTING LOCATIONS     Our team will contact you to schedule your sleep study, however, you can contact us as follow:  Main Phone Line (scheduling only): [phone]), option 3  Adult and Pediatric Locations  [facility] (6 years and older): [facility] - 4th floor ([facility]) After hours line: [phone]  [facility] (Main campus: All ages): [facility], 6th floor. After hours line: [phone]   [place] (5 years and older; younger considered on case-by-case basis): [address], [address]. Scheduling  After hours line: [phone]   [place] (6 years and older): [address]; [address]; [address]   [place] (6 years and older): [address] [facility], [address]  After hours line: [phone]   [place] (13 years and older) in [place]: [address] [facility], 2nd floor  After hours line: [phone]  [facility] (13 year and older): [address]  After hours line: [phone]     Adult Only Locations:   [place] (18 years and older): [address] [place] [address], 2nd floor   [place] (18 years and older): [address] [facility]; 4th floor  After hours line: [phone]  [facility] (18 years and older) at [place]: [address] [facility]  After hours line: [phone]     "      CONTACTING YOUR SLEEP MEDICINE PROVIDER     Send a message directly to your provider through \"My Chart\", which is the email service through your  Records Account: [email or web address]   Call [phone] and leave a message. One of the administrative assistants will forward the message to your sleep medicine provider through \"My Chart\" and/or email.     Your sleep medicine provider for this visit was: Erik Lyons DO        "

## 2025-03-07 ENCOUNTER — OFFICE VISIT (OUTPATIENT)
Dept: PRIMARY CARE | Facility: CLINIC | Age: 55
End: 2025-03-07
Payer: COMMERCIAL

## 2025-03-07 VITALS
WEIGHT: 193.6 LBS | SYSTOLIC BLOOD PRESSURE: 130 MMHG | HEIGHT: 71 IN | TEMPERATURE: 96.2 F | DIASTOLIC BLOOD PRESSURE: 83 MMHG | BODY MASS INDEX: 27.1 KG/M2 | OXYGEN SATURATION: 97 % | HEART RATE: 72 BPM

## 2025-03-07 DIAGNOSIS — M75.42 ROTATOR CUFF IMPINGEMENT SYNDROME OF LEFT SHOULDER: Primary | ICD-10-CM

## 2025-03-07 DIAGNOSIS — M54.2 NECK PAIN ON LEFT SIDE: ICD-10-CM

## 2025-03-07 PROCEDURE — 1036F TOBACCO NON-USER: CPT | Performed by: INTERNAL MEDICINE

## 2025-03-07 PROCEDURE — 3075F SYST BP GE 130 - 139MM HG: CPT | Performed by: INTERNAL MEDICINE

## 2025-03-07 PROCEDURE — 3079F DIAST BP 80-89 MM HG: CPT | Performed by: INTERNAL MEDICINE

## 2025-03-07 PROCEDURE — 99213 OFFICE O/P EST LOW 20 MIN: CPT | Performed by: INTERNAL MEDICINE

## 2025-03-07 PROCEDURE — 3008F BODY MASS INDEX DOCD: CPT | Performed by: INTERNAL MEDICINE

## 2025-03-07 RX ORDER — NAPROXEN 500 MG/1
500 TABLET ORAL
Qty: 14 TABLET | Refills: 0 | Status: SHIPPED | OUTPATIENT
Start: 2025-03-07 | End: 2025-03-14

## 2025-03-07 NOTE — PROGRESS NOTES
"Subjective   Patient ID: Lorna Gibbs is a 54 y.o. male who presents for Sick Visit (PT is having shoulder and neck pain).    Left handed gentleman, experiencing pain since falling on the ice about 3 weeks ago.  He actually fell and slipped on the ice landing on his right side but when he hit the ground he simultaneously had pain on the right hip where he fell and struck the ground as well as contralateral left shoulder and neck.  He reports significant bruising of the hip and leg which has since resolved.  The left shoulder was quite painful, hurts to move for the first few days but has slowly improved but just not back to baseline and continues hurting especially with certain motions such as reaching behind his back, reaching to put on his seatbelt etc.  Also having neck stiffness and pain especially with turning towards the left.  No radiating pain from either the neck or the shoulder.  He recalls having issues with the shoulder in the past, intermittently like with playing basketball or something.    Objective   Physical Exam    /83 (BP Location: Left arm, Patient Position: Sitting, BP Cuff Size: Adult)   Pulse 72   Temp 35.7 °C (96.2 °F) (Temporal)   Ht 1.803 m (5' 11\")   Wt 87.8 kg (193 lb 9.6 oz)   SpO2 97%   BMI 27.00 kg/m²      NAD  Decreased range of motion of the neck with turning towards the left, actively gets to about 45 degrees and passively unable to get it to about 60 degrees but limited compared to the right, normal strength  Positive passive arc test on the left, negative drop arm, normal strength with extension abduction and flexion, positive Rodgers test, negative Neer's test, negative Speed and Yergason, no acromioclavicular tenderness, negative crossarm    Assessment/Plan     Neck and shoulder pain after fall:  -Suspect some neck spasm of the muscles as there is no weakness or tenderness and no history suggestive of radiculopathy  -Appears to have rotator cuff impingement syndrome " probably acute on chronic/recurrent based on his prior history.  -Discussed treatment options and agreed to trial of NSAIDs and PT, consider referral based on response    Davide Henderson MD

## 2025-04-09 ENCOUNTER — OFFICE VISIT (OUTPATIENT)
Facility: CLINIC | Age: 55
End: 2025-04-09
Payer: COMMERCIAL

## 2025-04-09 VITALS — BODY MASS INDEX: 27.3 KG/M2 | WEIGHT: 195 LBS | HEIGHT: 71 IN

## 2025-04-09 DIAGNOSIS — E66.3 OVERWEIGHT (BMI 25.0-29.9): ICD-10-CM

## 2025-04-09 DIAGNOSIS — G47.33 OSA (OBSTRUCTIVE SLEEP APNEA): Primary | ICD-10-CM

## 2025-04-09 DIAGNOSIS — I10 PRIMARY HYPERTENSION: ICD-10-CM

## 2025-04-09 PROCEDURE — 1036F TOBACCO NON-USER: CPT | Performed by: GENERAL PRACTICE

## 2025-04-09 PROCEDURE — 99214 OFFICE O/P EST MOD 30 MIN: CPT | Performed by: GENERAL PRACTICE

## 2025-04-09 PROCEDURE — 3008F BODY MASS INDEX DOCD: CPT | Performed by: GENERAL PRACTICE

## 2025-04-09 ASSESSMENT — SLEEP AND FATIGUE QUESTIONNAIRES
HOW LIKELY ARE YOU TO NOD OFF OR FALL ASLEEP WHILE LYING DOWN TO REST IN THE AFTERNOON WHEN CIRCUMSTANCES PERMIT: MODERATE CHANCE OF DOZING
HOW LIKELY ARE YOU TO NOD OFF OR FALL ASLEEP IN A CAR, WHILE STOPPED FOR A FEW MINUTES IN TRAFFIC: SLIGHT CHANCE OF DOZING
HOW LIKELY ARE YOU TO NOD OFF OR FALL ASLEEP WHEN YOU ARE A PASSENGER IN A CAR FOR AN HOUR WITHOUT A BREAK: MODERATE CHANCE OF DOZING
HOW LIKELY ARE YOU TO NOD OFF OR FALL ASLEEP WHILE SITTING QUIETLY AFTER LUNCH WITHOUT ALCOHOL: SLIGHT CHANCE OF DOZING
SITING INACTIVE IN A PUBLIC PLACE LIKE A CLASS ROOM OR A MOVIE THEATER: WOULD NEVER DOZE
HOW LIKELY ARE YOU TO NOD OFF OR FALL ASLEEP WHILE SITTING AND TALKING TO SOMEONE: WOULD NEVER DOZE
HOW LIKELY ARE YOU TO NOD OFF OR FALL ASLEEP WHILE SITTING AND READING: MODERATE CHANCE OF DOZING

## 2025-04-09 ASSESSMENT — COLUMBIA-SUICIDE SEVERITY RATING SCALE - C-SSRS
1. IN THE PAST MONTH, HAVE YOU WISHED YOU WERE DEAD OR WISHED YOU COULD GO TO SLEEP AND NOT WAKE UP?: NO
2. HAVE YOU ACTUALLY HAD ANY THOUGHTS OF KILLING YOURSELF?: NO
6. HAVE YOU EVER DONE ANYTHING, STARTED TO DO ANYTHING, OR PREPARED TO DO ANYTHING TO END YOUR LIFE?: NO

## 2025-04-09 ASSESSMENT — PATIENT HEALTH QUESTIONNAIRE - PHQ9
SUM OF ALL RESPONSES TO PHQ9 QUESTIONS 1 AND 2: 0
1. LITTLE INTEREST OR PLEASURE IN DOING THINGS: NOT AT ALL
2. FEELING DOWN, DEPRESSED OR HOPELESS: NOT AT ALL

## 2025-04-09 NOTE — PROGRESS NOTES
Patient: Lorna Gibbs    [id number]  : 1970 -- AGE 54 y.o.    Provider: Erik Lyons DO     Location [facility]   Service Date: 2025              [facility]  Follow up Visit Note    Virtual or Telephone Consent  An interactive audio and video telecommunication system which permits real time communications between the patient (at the originating site) and provider (at the distant site) was utilized to provide this telehealth service.     Verbal consent was requested and obtained from Lorna Gibbs on this date, 25 for a telehealth visit.       HISTORY OF PRESENT ILLNESS     HISTORY OF PRESENT ILLNESS   Lorna Gibbs is a 54 y.o. male who presents to a [facility] for a sleep medicine evaluation with concerns of Sleep Apnea.     The patient  has a past medical history of Personal history of other (healed) physical injury and trauma, Personal history of other diseases of the nervous system and sense organs, and Radiculopathy, cervical region (2018)..    PAST SLEEP HISTORY    Pmhx includes elevated HTN?,     Patient had a sleep study done due to snoring for yrs, he has not tried any intervention for his snoring. He would like to discuss his sleep study results.     25  Patient was has been trying to use pap therapy but is struggling.   In the last month he only used pap therapy for 2 days.     When he used pap therapy her reports the following:  Pap benefit: decreased/ eliminated snoring/ daytime sleepiness.   Pap issues: mask air leak, and dry mouth.     25    Patient is trying to use pap therapy regularly.   Patient is comfortable with his settings.   He finds his mask to be uncomfortable; he has a ffm but [facility] is sending him pillows to try.     Pap issues: mask is uncomfortable. Admits to dry mouth. Denies perceived mask air leak but download shows a large leak.     Pap benefit: Decreased/ eliminated:  morning headaches and snoring.     Sleep schedule  on weekdays / work days:  Usual Bedtime: 11pm-12am  Sleep latency: varies but mostly short.   Wake time : 6 am  Total sleep time average/day: 7 hours/day  Awakenings: 3-4x per night, nocturia/ unknown, short.   Naps: few times per week, sometimes dozes off unintentionally.       Sleep schedule  on weekends/non work days :  Same as above    Sleep aids: n  Stimulants: n    Occupation: teacher, 6th grade.     Preferred sleeping position: SLEEP POSITION: sidelying    Sleep-related ROS:    Not using pap therapy regularly thus the following symptoms persist.     Snoring:  y but not with pap therapy.   Witnessed apneas:   n     Gasping/ choking: n    Am Dry mouth:  y             Nasal congestion:  sometimes        am headaches: n     Sleep is described as unrefreshing.     Daytime sleepiness: sometimes  Fatigue or decreased energy: sometimes   Difficulty remembering things in daytime: n  Difficulty staying focused in daytime: : n  Irritable during the day: n    Drowsy driving: n, coffee when needed   Hx of car accident: n  Near-miss Car accident: n      RLS screen:  RLSSCREEN: - Sensations: Patient does not have unusual sensations in their extremities that cause an urge to move them     Sleep-related behaviors: DENIES      REVIEW OF SYSTEMS     REVIEW OF SYSTEMS  Review of Systems   All other systems reviewed and are negative.      ALLERGIES AND MEDICATIONS     ALLERGIES  Allergies   Allergen Reactions    Kiwi Swelling       MEDICATIONS  Current Outpatient Medications   Medication Sig Dispense Refill    amLODIPine (Norvasc) 5 mg tablet Take 1 tablet (5 mg) by mouth once daily. 90 tablet 3    atorvastatin (Lipitor) 20 mg tablet Take 1 tablet (20 mg) by mouth once daily. 90 tablet 3     No current facility-administered medications for this visit.         PAST HISTORY     PAST MEDICAL HISTORY  He  has a past medical history of Personal history of other (healed) physical  "injury and trauma, Personal history of other diseases of the nervous system and sense organs, and Radiculopathy, cervical region (08/08/2018).      PAST SURGICAL HISTORY:  Past Surgical History:   Procedure Laterality Date    OTHER SURGICAL HISTORY  02/19/2016    Strabismus Repair By Recession       FAMILY HISTORY  Family History   Problem Relation Name Age of Onset    ALS Mother      Glaucoma Father      Other (leukocytosis) Father      Parkinsonism Father      Pulmonary embolism Father      Thyroid disease Sister      Cancer Maternal Grandmother      Heart attack Maternal Grandfather      Hyperlipidemia Other          siblings    Diabetes Other      Glaucoma Other grandmother      DOES/DOES NOT EC: does not have a family history of sleep disorder.      SOCIAL HISTORY  He  reports that he has quit smoking. His smoking use included cigarettes. He has been exposed to tobacco smoke. He has never used smokeless tobacco. He reports current alcohol use. He reports that he does not use drugs.     Caffeine consumption: 4-5 cups coffee throughout the day.   Alcohol consumption: no  Smoking: no  Marijuana: n  Other drugs: n      PHYSICAL EXAM     VITAL SIGNS: Ht 1.803 m (5' 11\")   Wt 88.5 kg (195 lb)   BMI 27.20 kg/m²      PREVIOUS WEIGHTS:  Wt Readings from Last 3 Encounters:   04/09/25 88.5 kg (195 lb)   03/07/25 87.8 kg (193 lb 9.6 oz)   11/27/24 86.5 kg (190 lb 12.8 oz)       Physical Exam  Constitutional: Alert and oriented, cooperative, no obvious distress.   HENT: normocephalic.   Neurologic: AOx3.   psychiatric: appropriate mood and affect.  Integumentary: no significant rashes observed over pap mask area.     Has facial hair.     RESULTS/DATA       ASSESSMENT/PLAN     Mr. Gibbs is a 54 y.o. male and  has a past medical history of Personal history of other (healed) physical injury and trauma, Personal history of other diseases of the nervous system and sense organs, and Radiculopathy, cervical region (08/08/2018). " He is following up on sleep apnea.     Problem List Items Addressed This Visit    None          Problem List and Orders  Pmhx includes elevated HTN?.     1- RADHA  PSG 7/11/24 --> severe RADHA, RDI 3% 30.6, RDI 4% 21.4, HINA 0.9. SpO2 misa 82%.     Reviewed and discussed the above sleep study results and management options in details. All questions answered, patient verbalizes understanding.     -cont. Autopap 5-15cwp, rAHI 5.2, median pressure 5.3, max avg 7.7 , acceptable compliance, has a large leak.     -ordered supplies  -ordered mask refitting.   -Please work with your DME to find a mask that fits you well, and controls your leak.    You can use a chin strap to help with your dry mouth, you can also adjust your humidity level to your comfort. You can use a nasal spray to help you breathe better from your nose.    -counseled on the importance of compliance in details.     -do not drive or operate heavy machinery if drowsy.  -avoid sleeping on your back.   -avoid sedating substances/ medication, alcohol, illicit drugs and tobacco.    2- Overweight   counseled on eating a healthy diet and exercising as tolerated.    3- HTN  Patient needs to measure his bp at home.   Cont. Current regimen  Follow up with pcp    Follow up 3-4 months or sooner as needed.

## 2025-06-06 ENCOUNTER — APPOINTMENT (OUTPATIENT)
Facility: CLINIC | Age: 55
End: 2025-06-06
Payer: COMMERCIAL

## 2025-08-01 ENCOUNTER — CLINICAL SUPPORT (OUTPATIENT)
Dept: SLEEP MEDICINE | Facility: CLINIC | Age: 55
End: 2025-08-01
Payer: COMMERCIAL

## 2025-08-01 DIAGNOSIS — G47.33 OSA (OBSTRUCTIVE SLEEP APNEA): ICD-10-CM

## 2025-08-02 NOTE — PROGRESS NOTES
Type of Study: HOME SLEEP STUDY - NOMAD     The patient received equipment and instructions for use of the [facility] HSAT device. The patient was instructed how to apply the effort belts, cannula, thermistor. It was also explained how the Nomad and oximeter components work.  The patient was asked to record their sleep for an 8-hour period.     The patient was informed of their responsibility for the device and acknowledged this by signing the HSAT device contract. The patient was asked to return the device on 08/02/2025 between the hours of 08:0 - 15:00  to the Sleep Center.     The patient was instructed to call 911 as usual for any medical- emergencies while at home.  The patient was also given a phone number for troubleshooting when using the device in case there were additional questions.

## 2025-08-06 ENCOUNTER — APPOINTMENT (OUTPATIENT)
Dept: PRIMARY CARE | Facility: CLINIC | Age: 55
End: 2025-08-06
Payer: COMMERCIAL

## 2025-11-04 ENCOUNTER — APPOINTMENT (OUTPATIENT)
Dept: PRIMARY CARE | Facility: CLINIC | Age: 55
End: 2025-11-04
Payer: COMMERCIAL